# Patient Record
Sex: MALE | Race: WHITE | NOT HISPANIC OR LATINO | Employment: FULL TIME | URBAN - NONMETROPOLITAN AREA
[De-identification: names, ages, dates, MRNs, and addresses within clinical notes are randomized per-mention and may not be internally consistent; named-entity substitution may affect disease eponyms.]

---

## 2019-07-01 ENCOUNTER — OFFICE VISIT (OUTPATIENT)
Dept: GASTROENTEROLOGY | Facility: CLINIC | Age: 54
End: 2019-07-01

## 2019-07-01 VITALS
BODY MASS INDEX: 29.49 KG/M2 | HEIGHT: 70 IN | DIASTOLIC BLOOD PRESSURE: 99 MMHG | HEART RATE: 52 BPM | SYSTOLIC BLOOD PRESSURE: 160 MMHG | TEMPERATURE: 97.2 F | RESPIRATION RATE: 18 BRPM | WEIGHT: 206 LBS

## 2019-07-01 DIAGNOSIS — Z85.038 HISTORY OF COLON CANCER: Chronic | ICD-10-CM

## 2019-07-01 DIAGNOSIS — R10.32 LEFT LOWER QUADRANT PAIN: Chronic | ICD-10-CM

## 2019-07-01 DIAGNOSIS — R13.10 DYSPHAGIA, UNSPECIFIED TYPE: Chronic | ICD-10-CM

## 2019-07-01 DIAGNOSIS — K62.5 BRIGHT RED BLOOD PER RECTUM: Primary | Chronic | ICD-10-CM

## 2019-07-01 DIAGNOSIS — K59.00 CONSTIPATION, UNSPECIFIED CONSTIPATION TYPE: Chronic | ICD-10-CM

## 2019-07-01 DIAGNOSIS — Z12.11 COLON CANCER SCREENING: ICD-10-CM

## 2019-07-01 PROCEDURE — 99204 OFFICE O/P NEW MOD 45 MIN: CPT | Performed by: NURSE PRACTITIONER

## 2019-07-01 RX ORDER — SODIUM CHLORIDE 9 MG/ML
70 INJECTION, SOLUTION INTRAVENOUS CONTINUOUS PRN
Status: CANCELLED | OUTPATIENT
Start: 2019-08-12

## 2019-07-01 NOTE — PROGRESS NOTES
"Chief Complaint   Patient presents with   • Establish Care   • Constipation   • Abdominal Pain     There is a long standing history of bright red blood per rectum. Over the past few months, the rectal bleeding worsened. He was having a moderate amount of bright red blood in the toilet with bowel movements. He has not had further episodes of bright red blood for the past 2-3 weeks.     There is a long standing history of constipation. The patient has 1-2 very firm bowel movements per day. The patient does not eat much fiber, but he does eat a significant amount of cheese daily. He is not taking anything for constipation.     There is a long standing history of intermittent left lower quadrant pain. The patient has the pain occasionally with bowel movements and with heavy lifting. The pain started after he had hernia repair. The pain is mild and burning.    There is a long standing history of difficulty swallowing. The patient has difficulty swallowing solids perhaps 1-2 times per week. No difficulty swallowing liquids. The patient denies heartburn. The patient denies nausea or vomiting.     The patient's last colonoscopy was in 2009. He has a history of colon cancer that was diagnosed in 2006. He had an \"apple core\" lesion at that time and had a colon resection. There is a family history of colon cancer in the patient's grandmother.    Abdominal Pain   This is a chronic problem. Episode onset: over 6 years ago. The onset quality is sudden. The problem occurs intermittently. The problem has been unchanged. The pain is located in the LLQ. The pain is mild. The quality of the pain is burning. The abdominal pain does not radiate. Associated symptoms include arthralgias, constipation, diarrhea and hematochezia. Pertinent negatives include no dysuria, fever, headaches, hematuria, myalgias, nausea or vomiting. Exacerbated by: bowel movements and heavy lifting. The pain is relieved by nothing. He has tried nothing for the " symptoms.   Constipation   This is a chronic problem. Episode onset: over 5 years. The problem is unchanged. Stool frequency: 1-2 times per day. The stool is described as firm. The patient is not on a high fiber diet. There has been adequate water intake. Associated symptoms include abdominal pain, diarrhea and hematochezia. Pertinent negatives include no fever, nausea or vomiting. He has tried nothing for the symptoms.   Rectal Bleeding   This is a chronic problem. Episode onset: over 5 years. The problem occurs intermittently. The problem has been gradually worsening. Associated symptoms include abdominal pain and arthralgias. Pertinent negatives include no chest pain, chills, coughing, fatigue, fever, headaches, joint swelling, myalgias, nausea, rash or vomiting. Nothing aggravates the symptoms. He has tried nothing for the symptoms.     Review of Systems   Constitutional: Negative for appetite change, chills, fatigue, fever and unexpected weight change.   HENT: Negative for mouth sores, nosebleeds and trouble swallowing.    Eyes: Negative for discharge and redness.   Respiratory: Negative for apnea, cough and shortness of breath.    Cardiovascular: Negative for chest pain, palpitations and leg swelling.   Gastrointestinal: Positive for abdominal pain, constipation, diarrhea and hematochezia. Negative for abdominal distention, anal bleeding, blood in stool, nausea and vomiting.   Endocrine: Negative for cold intolerance, heat intolerance and polydipsia.   Genitourinary: Negative for dysuria, hematuria and urgency.   Musculoskeletal: Positive for arthralgias. Negative for joint swelling and myalgias.   Skin: Negative for rash.   Allergic/Immunologic: Negative for food allergies and immunocompromised state.   Neurological: Negative for dizziness, seizures, syncope and headaches.   Hematological: Negative for adenopathy. Bruises/bleeds easily.   Psychiatric/Behavioral: Negative for dysphoric mood. The patient is not  "nervous/anxious and is not hyperactive.      Patient Active Problem List   Diagnosis   • Bright red blood per rectum   • Constipation   • Left lower quadrant pain   • Dysphagia   • History of colon cancer     Past Medical History:   Diagnosis Date   • Colon cancer (CMS/HCC) 2006   • Heart murmur     mild per patient    • Snores      Past Surgical History:   Procedure Laterality Date   • COLONOSCOPY  2006   • COLONOSCOPY  2009   • HERNIA REPAIR Left 2012   • TONSILLECTOMY      1966/1967   • UPPER GASTROINTESTINAL ENDOSCOPY  2009     Family History   Problem Relation Age of Onset   • Colon cancer Paternal Grandmother    • Alcohol abuse Neg Hx    • Liver cancer Neg Hx    • Stomach cancer Neg Hx      Social History     Tobacco Use   • Smoking status: Current Every Day Smoker     Packs/day: 2.50     Types: Cigarettes   • Smokeless tobacco: Current User     Types: Chew   • Tobacco comment: Chewing tobacco only while hunting per pt.    Substance Use Topics   • Alcohol use: Yes     Comment: 12 pack per year.      No current outpatient medications on file.    Allergies   Allergen Reactions   • Asa [Aspirin] GI Intolerance     Stomach pains      Blood pressure 160/99, pulse 52, temperature 97.2 °F (36.2 °C), resp. rate 18, height 176.5 cm (69.5\"), weight 93.4 kg (206 lb).    Physical Exam   Constitutional: He is oriented to person, place, and time. He appears well-developed and well-nourished. No distress.   HENT:   Head: Normocephalic and atraumatic.   Right Ear: Hearing and external ear normal.   Left Ear: Hearing and external ear normal.   Nose: Nose normal.   Mouth/Throat: Oropharynx is clear and moist and mucous membranes are normal. Mucous membranes are not pale, not dry and not cyanotic. No oral lesions. No oropharyngeal exudate.   Eyes: Conjunctivae and EOM are normal. Right eye exhibits no discharge. Left eye exhibits no discharge.   Neck: Trachea normal. Neck supple. No JVD present. No edema present. No thyroid mass " and no thyromegaly present.   Cardiovascular: Normal rate, regular rhythm, S2 normal and normal heart sounds. Exam reveals no gallop, no S3 and no friction rub.   No murmur heard.  Pulmonary/Chest: Effort normal and breath sounds normal. No respiratory distress. He exhibits no tenderness.   Abdominal: Normal appearance and bowel sounds are normal. He exhibits no distension, no ascites and no mass. There is no splenomegaly or hepatomegaly. There is no tenderness. There is no rigidity, no rebound and no guarding. No hernia.     Vascular Status -  His right foot exhibits no edema. His left foot exhibits no edema.  Lymphadenopathy:     He has no cervical adenopathy.        Left: No supraclavicular adenopathy present.   Neurological: He is alert and oriented to person, place, and time. He has normal strength. No cranial nerve deficit or sensory deficit.   Skin: No rash noted. He is not diaphoretic. No cyanosis. No pallor. Nails show no clubbing.   Psychiatric: He has a normal mood and affect.   Nursing note and vitals reviewed.  Stigmata of chronic liver disease:  None.  Asterixis:  None.    Assessment:      ICD-10-CM ICD-9-CM   1. Bright red blood per rectum K62.5 569.3   2. Constipation, unspecified constipation type K59.00 564.00   3. Left lower quadrant pain R10.32 789.04   4. Dysphagia, unspecified type R13.10 787.20   5. History of colon cancer Z85.038 V10.05   6. Colon cancer screening Z12.11 V76.51       Discussion:  1. Long-standing history of bright red blood per rectum.  Differentials include hemorrhoids, fissure, vascular ectasia, underlying colorectal neoplastic disease.  2. Long-standing history of constipation.  3. Long-standing history of left lower quadrant pain.  Nontender to palpation.  Etiology unclear.    4. Long-standing history of difficulty swallowing.  Differentials include Schatzki's ring, esophagitis, esophageal dysmotility.  5. The patient's last colonoscopy was 10 years ago.  There is a  "personal history of colon cancer in 2006 with \"apple core lesion\" per patient.    Plan/  Patient Instructions   1. High fiber diet with liberal water intake. Discussed in detail.   2. EGD-the patient wishes to wait at this time.   3. Colonoscopy: Description of the procedure, risks, benefits, alternatives and options, including nonoperative options, were discussed with the patient in detail. The patient understands and wishes to proceed.     Farhad Mitchell, APRN    "

## 2019-07-01 NOTE — PATIENT INSTRUCTIONS
1. High fiber diet with liberal water intake. Discussed in detail.   2. EGD-the patient wishes to wait at this time.   3. Colonoscopy: Description of the procedure, risks, benefits, alternatives and options, including nonoperative options, were discussed with the patient in detail. The patient understands and wishes to proceed.

## 2019-08-05 PROBLEM — Z12.11 COLON CANCER SCREENING: Status: ACTIVE | Noted: 2019-08-05

## 2019-08-12 ENCOUNTER — ANESTHESIA EVENT (OUTPATIENT)
Dept: GASTROENTEROLOGY | Facility: HOSPITAL | Age: 54
End: 2019-08-12

## 2019-08-12 ENCOUNTER — HOSPITAL ENCOUNTER (OUTPATIENT)
Facility: HOSPITAL | Age: 54
Setting detail: HOSPITAL OUTPATIENT SURGERY
Discharge: HOME | End: 2019-08-12
Attending: INTERNAL MEDICINE | Admitting: INTERNAL MEDICINE

## 2019-08-12 ENCOUNTER — ANESTHESIA (OUTPATIENT)
Dept: GASTROENTEROLOGY | Facility: HOSPITAL | Age: 54
End: 2019-08-12

## 2019-08-12 VITALS
OXYGEN SATURATION: 99 % | BODY MASS INDEX: 29.49 KG/M2 | WEIGHT: 206 LBS | HEART RATE: 75 BPM | HEIGHT: 70 IN | RESPIRATION RATE: 16 BRPM | SYSTOLIC BLOOD PRESSURE: 123 MMHG | TEMPERATURE: 97.3 F | DIASTOLIC BLOOD PRESSURE: 89 MMHG

## 2019-08-12 DIAGNOSIS — Z12.11 COLON CANCER SCREENING: ICD-10-CM

## 2019-08-12 DIAGNOSIS — R13.10 DYSPHAGIA, UNSPECIFIED TYPE: ICD-10-CM

## 2019-08-12 DIAGNOSIS — K62.5 BRIGHT RED BLOOD PER RECTUM: ICD-10-CM

## 2019-08-12 DIAGNOSIS — Z85.038 HISTORY OF COLON CANCER: ICD-10-CM

## 2019-08-12 DIAGNOSIS — R10.32 LEFT LOWER QUADRANT PAIN: ICD-10-CM

## 2019-08-12 DIAGNOSIS — K59.00 CONSTIPATION, UNSPECIFIED CONSTIPATION TYPE: ICD-10-CM

## 2019-08-12 PROCEDURE — 45385 COLONOSCOPY W/LESION REMOVAL: CPT | Performed by: INTERNAL MEDICINE

## 2019-08-12 PROCEDURE — 45380 COLONOSCOPY AND BIOPSY: CPT | Performed by: INTERNAL MEDICINE

## 2019-08-12 PROCEDURE — 45381 COLONOSCOPY SUBMUCOUS NJX: CPT | Performed by: INTERNAL MEDICINE

## 2019-08-12 PROCEDURE — 25010000002 PROPOFOL 1000 MG/ML EMULSION: Performed by: NURSE ANESTHETIST, CERTIFIED REGISTERED

## 2019-08-12 PROCEDURE — 25010000002 PROPOFOL 200 MG/20ML EMULSION: Performed by: NURSE ANESTHETIST, CERTIFIED REGISTERED

## 2019-08-12 PROCEDURE — S0260 H&P FOR SURGERY: HCPCS | Performed by: INTERNAL MEDICINE

## 2019-08-12 DEVICE — DEV CLIP GI QUICKCLIPPRO FIX ROT 2300MM: Type: IMPLANTABLE DEVICE | Status: FUNCTIONAL

## 2019-08-12 RX ORDER — KETAMINE HYDROCHLORIDE 50 MG/ML
INJECTION, SOLUTION, CONCENTRATE INTRAMUSCULAR; INTRAVENOUS AS NEEDED
Status: DISCONTINUED | OUTPATIENT
Start: 2019-08-12 | End: 2019-08-12 | Stop reason: SURG

## 2019-08-12 RX ORDER — SODIUM CHLORIDE, SODIUM LACTATE, POTASSIUM CHLORIDE, CALCIUM CHLORIDE 600; 310; 30; 20 MG/100ML; MG/100ML; MG/100ML; MG/100ML
1000 INJECTION, SOLUTION INTRAVENOUS CONTINUOUS
Status: CANCELLED | OUTPATIENT
Start: 2019-08-12

## 2019-08-12 RX ORDER — LIDOCAINE 50 MG/G
OINTMENT TOPICAL AS NEEDED
Status: DISCONTINUED | OUTPATIENT
Start: 2019-08-12 | End: 2019-08-12 | Stop reason: HOSPADM

## 2019-08-12 RX ORDER — PROPOFOL 10 MG/ML
INJECTION, EMULSION INTRAVENOUS AS NEEDED
Status: DISCONTINUED | OUTPATIENT
Start: 2019-08-12 | End: 2019-08-12 | Stop reason: SURG

## 2019-08-12 RX ORDER — SIMETHICONE 20 MG/.3ML
EMULSION ORAL AS NEEDED
Status: DISCONTINUED | OUTPATIENT
Start: 2019-08-12 | End: 2019-08-12 | Stop reason: HOSPADM

## 2019-08-12 RX ORDER — SODIUM CHLORIDE 9 MG/ML
70 INJECTION, SOLUTION INTRAVENOUS CONTINUOUS PRN
Status: DISCONTINUED | OUTPATIENT
Start: 2019-08-12 | End: 2019-08-12 | Stop reason: HOSPADM

## 2019-08-12 RX ADMIN — PROPOFOL 50 MG: 10 INJECTION, EMULSION INTRAVENOUS at 11:54

## 2019-08-12 RX ADMIN — SODIUM CHLORIDE: 9 INJECTION, SOLUTION INTRAVENOUS at 11:00

## 2019-08-12 RX ADMIN — PROPOFOL 120 MCG/KG/MIN: 10 INJECTION, EMULSION INTRAVENOUS at 11:05

## 2019-08-12 RX ADMIN — SODIUM CHLORIDE 70 ML/HR: 9 INJECTION, SOLUTION INTRAVENOUS at 08:46

## 2019-08-12 RX ADMIN — KETAMINE HYDROCHLORIDE 20 MG: 50 INJECTION, SOLUTION INTRAMUSCULAR; INTRAVENOUS at 11:00

## 2019-08-12 RX ADMIN — PROPOFOL 100 MG: 10 INJECTION, EMULSION INTRAVENOUS at 11:00

## 2019-08-12 NOTE — H&P
"Chief complaint:  BRBPR, Constipation    History of present illness:  Colon Cancer Screen, Last Colonoscopy 2009, H/O Colon CA(2006), Hx Colon resection (apple core lesion), Family History of Colon CA (GM), BRBPR, Constipation, LLQ Abdominal pain, Dysphagia       Past medical history:   Past Medical History:   Diagnosis Date   • Arthritis    • Carpal tunnel syndrome on both sides    • Colon cancer (CMS/HCC) 2006   • Hay fever     \"SINUSES DRAIN WITH IT AND COUGHING FIRST THING IN THE MORNING THEN IT CLEARS UP\" PATIENT REPORTS   • Heart murmur     mild per patient    • History of chemotherapy 2006    FOR COLON CANCER   • Snores        Surgical history:    Past Surgical History:   Procedure Laterality Date   • COLONOSCOPY  2006   • COLONOSCOPY  2009   • HERNIA REPAIR Left 2012    INGUINAL   • TEETH EXTRACTION     • TONSILLECTOMY      1966/1967   • TONSILLECTOMY     • UPPER GASTROINTESTINAL ENDOSCOPY  2009       Social history:  Social History     Socioeconomic History   • Marital status:      Spouse name: Not on file   • Number of children: Not on file   • Years of education: Not on file   • Highest education level: Not on file   Tobacco Use   • Smoking status: Current Every Day Smoker     Packs/day: 2.50     Years: 40.00     Pack years: 100.00     Types: Cigarettes   • Smokeless tobacco: Current User     Types: Chew   • Tobacco comment: Chewing tobacco only while hunting per pt.  pt reports smoked this am 8/12/19   Substance and Sexual Activity   • Alcohol use: Yes     Comment: 12 pack per year.    • Drug use: No   • Sexual activity: Defer       Allergies:  Asa [aspirin]  Latex allergy: None  Contrast allergy: None    Medications:  No medications prior to admission.       Review of systems:   Constitutional: No recent: Fever, Weight loss,   Respiratory: No recent: SOB, Cough,   Cardiovascular: No recent: Chest Pains, congestive heart failure.  Neurological: No recent: Seizures, CVA, TIA.   Genitourinary: No " "recent: Renal Failure, UTI.  Endocrine: No recent: Worsening of diabetes or thyroid disease.  Musculoskeletal: No recent: Joint swelling.  Hem. Oncology: No recent: Anemia or bleeding.  Psychiatric: No recent: Worsening of depression or anxiety.     VITAL SIGNS:    Blood pressure 148/99, pulse 62, temperature 97.4 °F (36.3 °C), temperature source Temporal, resp. rate 18, height 176.5 cm (69.5\"), weight 93.4 kg (206 lb), SpO2 93 %.    PHYSICAL EXAMINATION:   HEENT: Normal.   Lungs: Clear to auscultation.  Heart: No S3, no murmur.    Abdomen: Soft.  BS+ ND, NT  Extremities: No edema.  No cyanosis.  Neuro: Alert X 3. No focal deficit.    Assessment: Colon Cancer Screen, Last Colonoscopy 2009, H/O Colon CA(2006), Hx Colon resection (apple core lesion), Family History of Colon CA (GM), BRBPR, Constipation, LLQ Abdominal pain,       Plan:   Colonoscopy    Risks/Benefits:  The potential benefits, risk and/or side effects of the procedure and alternatives have been discussed with the patient/authorized representative and questions were answered.    "

## 2019-08-12 NOTE — ANESTHESIA POSTPROCEDURE EVALUATION
Patient: Chito Steele    Procedure Summary     Date:  08/12/19 Room / [facility]    Anesthesia Start:  1100 Anesthesia Stop:  1221    Procedure:  COLONOSCOPY, cold snare polypectomies, hot snare polypectomies, cold biopsy polypectomies, quick clip pro placement x 3, injection of adilene ink, (N/A Anus) Diagnosis:       Diverticulosis of colon      Colon polyps      Nodule of colon      Lipoma of colon      Internal hemorrhoid      External hemorrhoids      (Bright red blood per rectum [K62.5])      (Constipation, unspecified constipation type [K59.00])      (Left lower quadrant pain [R10.32])      (Dysphagia, unspecified type [R13.10])      (History of colon cancer [Z85.038])      (Colon cancer screening [Z12.11])    Surgeon:  Conor Best MD Provider:  Rhys Dumas CRNA    Anesthesia Type:  MAC ASA Status:  3          Anesthesia Type: MAC  Last vitals  BP   123/89 (08/12/19 1225)   Temp   97.3 °F (36.3 °C) (08/12/19 1225)   Pulse   75 (08/12/19 1225)   Resp   16 (08/12/19 1225)     SpO2   99 % (08/12/19 1225)     Post Anesthesia Care and Evaluation    Patient location during evaluation: bedside  Patient participation: complete - patient participated  Level of consciousness: awake  Pain management: satisfactory to patient  Airway patency: patent  Anesthetic complications: No anesthetic complications  PONV Status: none  Cardiovascular status: acceptable and hemodynamically stable  Respiratory status: acceptable  Hydration status: acceptable

## 2019-08-12 NOTE — OP NOTE
PROCEDURE:  Colonoscopy to the terminal ileum with submucosal nodule ectomy, thermal ablation therapy using argon plasma coagulator, placement of 3 QUICK CLIP PROS to coapt the margins, submucosal injection of Kayleigh ink for marking, 2 hot snare and one cold snare polypectomies as well as 9 cold biopsy polypectomies and biopsies.    DATE OF PROCEDURE:  August 12, 2019    REFERRING PROVIDER:  Adolfo Rodríguez MD     INSTRUMENT USED: Olympus PCF H 190 videocolonoscope.      INDICATIONS OF THE PROCEDURE: This is a 53-year-old white male for colon cancer screening.  There is history of constipation.  The patient has personal history of colon cancer.  This was resected in 2006.  History of left lower quadrant abdominal pain.  Positive family history of colon cancer (grandmother).    PREVIOUS COLONOSCOPY: 2009.    BIOPSIES: Ascending colon: One hot snare and one cold snare polypectomies.  Transverse colon: One hot snare polypectomy.  Descending colon: 2 cold biopsy polypectomies.  Rectum: 7 cold biopsy polypectomies.  Rectum: Submucosal nodule ectomy.  Biopsies were obtained from the colorectal anastomotic site.      PHOTOGRAPHS:  Photographs were included in the medical records.     MEDICATIONS:  MAC.       CONSENT/PREPROCEDURE EVALUATION:  Risks, benefits, alternatives and options of the procedure including risks of sedation/anesthesia were discussed with the patient and informed consent was obtained prior to the procedure.  History and physical examination were performed and nothing precluded the test.      REPORT:  The patient was placed in left lateral decubitus position and a digital examination was performed.  Once under the influence of IV sedation, the instrument was inserted into the rectum and advanced under direct vision to cecum which was identified by the ileocecal valve, triradiate folds and appendiceal orifice. The scope was then maneuvered into the terminal ileum.        FINDINGS:      Digital rectal  examination:  Good anal tone.  No perianal pathology.  No mass.  Small old external hemorrhoids.        Terminal ileum:  7-8 cm.  Normal.     Cecum and ascending colon: A 5 and 8 mm sessile polyp was noted in the ascending colon.  One was removed with cold snare and one with hot snare.     Hepatic flexure, transverse colon, splenic flexure: A 7 mm sessile polyp from the transverse colon was removed with hot snare.     Descending colon, sigmoid colon and rectum: Scant left-sided diverticulosis.  In the descending colon 2 small 3 mm sessile polyps were noted.  These were removed with cold biopsy forceps.  7, 3 mm sessile polyps in the rectum were removed with cold biopsy forceps.  Surgical changes consistent with sigmoid resection colonic anastomosis was seen.  This area was noted to be stable.  Biopsies were obtained from the anastomotic site.   A 10 mm submucosal rather found yellowish nodule was noted in the rectum.  Mucosa was resected over the nodule on both the nodule.  Submucosal nodule ectomy was performed using special technique the nodule is a snare using hot snare.  The base was thermally ablated using argon plasma coagulator (Rectal APC mode/ERBE/Side firing probe).  3 quick clip pros were placed to coapt the margins.   Submucosal injection of Kayleigh ink 0.5 ml was undertaken for marking.  A retroflex examination within the rectum revealed internal hemorrhoids.        The scope was then straightened, the lower GI tract was decompressed, and the scope was pulled out of the patient.  The patient tolerated the procedure well.  There were no immediate complications and the patient was transferred in stable condition for post procedure observation.      TECHNICAL DATA:   1. [place] prep score: 8 (3+2+3).    2. Anus to cecal time: 3  minutes.  3. Difficulty of examination:  Average.    4. Withdrawal time: 20 minutes.  5. Procedure time: 1 hour and 5 minutes  6. Retroflex examination in right colon: Yes.     7. Second look Rectum to cecum with decompression: Yes.    DIAGNOSES:    1. Scant left-sided diverticulosis.  2. Colon polyps.  12 were removed.  3-8 mm in size.  3. Colonic nodule.  Status post submucosal nodule ectomy.  4. Colon lipoma.  5. Internal hemorrhoids.  Small old external hemorrhoidal tissue.    RECOMMENDATIONS:     1. Dietary instructions.  2. Follow biopsies.    3. Follow-up: 3 to 4 weeks.  The patient may call in 1 week regarding biopsy results.     4. Followup colonoscopy: Depending on the biopsy results.       Thank you very much for letting me participate in the care of this patient. Please do not hesitate to call me if you have any questions.

## 2019-08-12 NOTE — ANESTHESIA PREPROCEDURE EVALUATION
Anesthesia Evaluation     Patient summary reviewed and Nursing notes reviewed   no history of anesthetic complications:  NPO Solid Status: > 8 hours  NPO Liquid Status: > 8 hours           Airway   Mallampati: I  TM distance: >3 FB  Neck ROM: full  no difficulty expected  Dental - normal exam     Pulmonary - negative pulmonary ROS and normal exam   Cardiovascular - normal exam    (+) valvular problems/murmurs,       Neuro/Psych- negative ROS  GI/Hepatic/Renal/Endo - negative ROS     Musculoskeletal (-) negative ROS    Abdominal    Substance History - negative use     OB/GYN negative ob/gyn ROS         Other - negative ROS                       Anesthesia Plan    ASA 3     MAC     intravenous induction   Anesthetic plan, all risks, benefits, and alternatives have been provided, discussed and informed consent has been obtained with: patient.

## 2019-08-12 NOTE — DISCHARGE INSTRUCTIONS
No pushing, pulling, tugging,  heavy lifting, or strenuous activity.  No major decision making, driving, or drinking alcoholic beverages for 24 hours. ( due to the medications you have  received)  Always use good hand hygiene/washing techniques.  NO driving while taking pain medications.    To assist you in voiding:  Drink plenty of fluids  Listen to running water while attempting to void.    If you are unable to urinate and you have an uncomfortable urge to void or it has been   6 hours since you were discharged, return to the Emergency Room    ************************************************************************************    Postprocedure instructions:    1. Nothing by mouth to fully alert.  2. Once fully alert may have clear liquid diet.  3. Advance diet as tolerated.  4. Vital signs as routine.    Diet:     1. Low-fat diet.  2. Geovanna's All Bran-Bran Buds 1/4 cup daily.  3. May add Honey Bunches of Oats with Almonds 1/4 cup for taste.  4. Use almond milk, 1 or 2% milk.  5. Drink liberal amounts of water.    Blood Thinner Directions:    Avoid Aspirin & other NSAIDS for _7__ days. Tylenol is okay.    Treatments:      Other Instructions:    Call [facility] at [phone] or come to the Emergency Department if you experience the following: Chest pain, abdominal pain, bleeding (vomiting of blood or coffee colored material, black stools or heaven blood in stools), fever/chills, nausea and vomiting or dizziness.      Follow-up:  DR. HOMER BEST in 3-4 weeks.Office phone # [phone].  The patient may however call back in 1 week for biopsy results.    Follow-up colonoscopy: This will be discussed in the office.    ************************************************************************************    Notes to the patient and the family from Dr. Best.    Dear patient/family member,    Today your colon was examined extensively from rectum to cecum and beyond into the small intestine twice.    Findings on today's procedure are as follows:    1. Scant left-sided diverticulosis. These are benign outpouchings in the colon.   2. Colon polyps. 12 were found and removed.   3. Nodule in the rectum under the lining of the rectum.  This was resected.  4. No cancer. No inflammation or colitis. No suggestion of Crohn's disease.  5. Internal hemorrhoids.  Small external hemorrhoidal tissue.    Recommendations:    1. As above.  2. If hemorrhoidal problems use CORTIZONE 10 OINTMENT (hydrocortisone 1% ointment) over the counter. AVOID CREAM OR GEL.  Apply anorectally  2-3 times a day for 1 week.  May need to use a liner to protect the garments.  3. Instructions regarding MRI and clips.        Should you have more questions please do not hesitate to talk to the nurse who can call me and let me talk to you.      I hope you feel better.    Conor Best M.D., FACP, FACG.

## 2019-08-15 LAB
LAB AP CASE REPORT: NORMAL
PATH REPORT.FINAL DX SPEC: NORMAL

## 2021-01-01 ENCOUNTER — HOSPITAL ENCOUNTER (EMERGENCY)
Facility: HOSPITAL | Age: 56
Discharge: ANOTHER HOSPITAL | End: 2021-01-01
Attending: EMERGENCY MEDICINE | Admitting: EMERGENCY MEDICINE

## 2021-01-01 ENCOUNTER — APPOINTMENT (OUTPATIENT)
Dept: GENERAL RADIOLOGY | Facility: HOSPITAL | Age: 56
End: 2021-01-01

## 2021-01-01 VITALS
RESPIRATION RATE: 16 BRPM | HEART RATE: 54 BPM | SYSTOLIC BLOOD PRESSURE: 138 MMHG | HEIGHT: 70 IN | TEMPERATURE: 97.8 F | BODY MASS INDEX: 28.63 KG/M2 | WEIGHT: 200 LBS | DIASTOLIC BLOOD PRESSURE: 94 MMHG | OXYGEN SATURATION: 97 %

## 2021-01-01 DIAGNOSIS — S46.922A: ICD-10-CM

## 2021-01-01 DIAGNOSIS — S51.812A LACERATION OF LEFT FOREARM, INITIAL ENCOUNTER: ICD-10-CM

## 2021-01-01 DIAGNOSIS — S41.112A: ICD-10-CM

## 2021-01-01 DIAGNOSIS — S52.92XB TYPE I OR II OPEN FRACTURE OF LEFT FOREARM, INITIAL ENCOUNTER: Primary | ICD-10-CM

## 2021-01-01 PROCEDURE — 25010000002 TDAP 5-2.5-18.5 LF-MCG/0.5 SUSPENSION: Performed by: PHYSICIAN ASSISTANT

## 2021-01-01 PROCEDURE — 90471 IMMUNIZATION ADMIN: CPT

## 2021-01-01 PROCEDURE — 96368 THER/DIAG CONCURRENT INF: CPT

## 2021-01-01 PROCEDURE — 96375 TX/PRO/DX INJ NEW DRUG ADDON: CPT

## 2021-01-01 PROCEDURE — 99283 EMERGENCY DEPT VISIT LOW MDM: CPT

## 2021-01-01 PROCEDURE — 96365 THER/PROPH/DIAG IV INF INIT: CPT

## 2021-01-01 PROCEDURE — 90715 TDAP VACCINE 7 YRS/> IM: CPT | Performed by: PHYSICIAN ASSISTANT

## 2021-01-01 PROCEDURE — 25010000003 CEFAZOLIN SODIUM-DEXTROSE 1-4 GM-%(50ML) RECONSTITUTED SOLUTION: Performed by: PHYSICIAN ASSISTANT

## 2021-01-01 PROCEDURE — 73090 X-RAY EXAM OF FOREARM: CPT

## 2021-01-01 PROCEDURE — 90471 IMMUNIZATION ADMIN: CPT | Performed by: PHYSICIAN ASSISTANT

## 2021-01-01 PROCEDURE — 25010000002 FENTANYL CITRATE (PF) 100 MCG/2ML SOLUTION: Performed by: EMERGENCY MEDICINE

## 2021-01-01 PROCEDURE — 96376 TX/PRO/DX INJ SAME DRUG ADON: CPT

## 2021-01-01 RX ORDER — FENTANYL CITRATE 50 UG/ML
50 INJECTION, SOLUTION INTRAMUSCULAR; INTRAVENOUS ONCE
Status: COMPLETED | OUTPATIENT
Start: 2021-01-01 | End: 2021-01-01

## 2021-01-01 RX ORDER — CEFAZOLIN SODIUM 1 G/50ML
1 SOLUTION INTRAVENOUS ONCE
Status: COMPLETED | OUTPATIENT
Start: 2021-01-01 | End: 2021-01-01

## 2021-01-01 RX ADMIN — SODIUM CHLORIDE 1000 ML: 9 INJECTION, SOLUTION INTRAVENOUS at 14:41

## 2021-01-01 RX ADMIN — FENTANYL CITRATE 50 MCG: 50 INJECTION, SOLUTION INTRAMUSCULAR; INTRAVENOUS at 15:30

## 2021-01-01 RX ADMIN — FENTANYL CITRATE 50 MCG: 50 INJECTION, SOLUTION INTRAMUSCULAR; INTRAVENOUS at 14:42

## 2021-01-01 RX ADMIN — CEFAZOLIN SODIUM 1 G: 1 SOLUTION INTRAVENOUS at 14:47

## 2021-01-01 RX ADMIN — TETANUS TOXOID, REDUCED DIPHTHERIA TOXOID AND ACELLULAR PERTUSSIS VACCINE, ADSORBED 0.5 ML: 5; 2.5; 8; 8; 2.5 SUSPENSION INTRAMUSCULAR at 14:46

## 2021-01-01 NOTE — ED PROVIDER NOTES
"Subjective   85-year-old male suffered injury to his left forearm, he was using a , the  slipped on metal, and cut his left forearm, he feels like it cut to the bone.  He has numbness and tingling in the left wrist and hand and decreased sensation, he also has difficulty moving the left wrist.      History provided by:  Patient   used: No        Review of Systems   Musculoskeletal:        Left hand numbness and tingling, left wrist difficulty with movement.  Laceration to left forearm   All other systems reviewed and are negative.      Past Medical History:   Diagnosis Date   • Arthritis    • Carpal tunnel syndrome on both sides    • Colon cancer (CMS/HCC) 2006   • Hay fever     \"SINUSES DRAIN WITH IT AND COUGHING FIRST THING IN THE MORNING THEN IT CLEARS UP\" PATIENT REPORTS   • Heart murmur     mild per patient    • History of chemotherapy 2006    FOR COLON CANCER   • Snores        Allergies   Allergen Reactions   • Asa [Aspirin] GI Intolerance     Stomach pains        Past Surgical History:   Procedure Laterality Date   • COLONOSCOPY  2006   • COLONOSCOPY  2009   • COLONOSCOPY N/A 8/12/2019    Procedure: COLONOSCOPY, cold snare polypectomies, hot snare polypectomies, cold biopsy polypectomies, quick clip pro placement x 3, injection of adilene ink, nodulectomy;  Surgeon: Conor Best MD;  Location: [facility] ENDOSCOPY;  Service: Gastroenterology   • HERNIA REPAIR Left 2012    INGUINAL   • TEETH EXTRACTION     • TONSILLECTOMY      1966/1967   • TONSILLECTOMY     • UPPER GASTROINTESTINAL ENDOSCOPY  2009       Family History   Problem Relation Age of Onset   • Colon cancer Paternal Grandmother    • Alcohol abuse Neg Hx    • Liver cancer Neg Hx    • Stomach cancer Neg Hx        Social History     Socioeconomic History   • Marital status:      Spouse name: Not on file   • Number of children: Not on file   • Years of education: Not on file   • Highest education level: Not on file "   Tobacco Use   • Smoking status: Current Every Day Smoker     Packs/day: 2.50     Years: 40.00     Pack years: 100.00     Types: Cigarettes   • Smokeless tobacco: Current User     Types: Chew   • Tobacco comment: Chewing tobacco only while hunting per pt.  pt reports smoked this am 8/12/19   Substance and Sexual Activity   • Alcohol use: Yes     Comment: 12 pack per year.    • Drug use: No   • Sexual activity: Defer           Objective   Physical Exam  Vitals signs and nursing note reviewed.   Constitutional:       Appearance: He is well-developed.   HENT:      Head: Normocephalic and atraumatic.   Neck:      Musculoskeletal: Normal range of motion.   Cardiovascular:      Rate and Rhythm: Normal rate and regular rhythm.   Pulmonary:      Effort: Pulmonary effort is normal.      Breath sounds: Normal breath sounds.   Musculoskeletal:         General: Tenderness and signs of injury present.        Arms:       Comments: 4 cm open wound left volar surface distal third forearm.  Exposed lacerated tendon.  Decreased range of motion of the wrist with flexion.   Skin:     General: Skin is warm and dry.   Neurological:      Mental Status: He is alert and oriented to person, place, and time.         Procedures           ED Course  ED Course as of Jan 01 1503   Fri Jan 01, 2021   1436 Dr. Galarza present at the bedside to evaluate the patient    [CS]   1457 Discussed with Dr. Ritter  [facility]th[facility], they accepted the patient for transfer    [CS]      ED Course User Index  [CS] Clemente Valdovinos Jr., BETZAIDA                                           MDM  Number of Diagnoses or Management Options  Laceration of left forearm, initial encounter: new and requires workup  Laceration of left upper arm with tendon involvement, initial encounter: new and requires workup  Type I or II open fracture of left forearm, initial encounter: new and requires workup     Amount and/or Complexity of Data Reviewed  Clinical lab tests: reviewed  Tests in  the radiology section of CPT®: reviewed  Discuss the patient with other providers: yes    Risk of Complications, Morbidity, and/or Mortality  Presenting problems: low  Diagnostic procedures: minimal  Management options: low    Patient Progress  Patient progress: stable      Final diagnoses:   Type I or II open fracture of left forearm, initial encounter   Laceration of left forearm, initial encounter   Laceration of left upper arm with tendon involvement, initial encounter            Clemente Valdovinos Jr., PA-C  01/01/21 1506

## (undated) DEVICE — TRAP,MUCUS SPECIMEN,40CC: Brand: MEDLINE

## (undated) DEVICE — Device: Brand: DEFENDO AIR/WATER/SUCTION AND BIOPSY VALVE

## (undated) DEVICE — ENDOSCOPY PORT CONNECTOR FOR OLYMPUS® SCOPES: Brand: ERBE

## (undated) DEVICE — HYBRID TUBING/CAP SET FOR OLYMPUS® SCOPES: Brand: ERBE

## (undated) DEVICE — NDL SCLEROTHERAPY INTERJECT 25G 4 240CM

## (undated) DEVICE — FIAPC® PROBE W/ FILTER 2200 SC OD 2.3MM/6.9FR; L 2.2M/7.2FT: Brand: ERBE

## (undated) DEVICE — PAD GRND REM POLYHESIVE A/ DISP

## (undated) DEVICE — FRCP BIOP COLD ENDOJAW ALLGTR W/NDL 2.8X2300MM BLU

## (undated) DEVICE — LUBE JELLY PK/2.75GM STRL BX/144

## (undated) DEVICE — SNAR POLYP SENSATION STDOVL 27 240 BX40

## (undated) DEVICE — Device

## (undated) DEVICE — INDIA INK